# Patient Record
Sex: MALE | Race: WHITE | HISPANIC OR LATINO | URBAN - METROPOLITAN AREA
[De-identification: names, ages, dates, MRNs, and addresses within clinical notes are randomized per-mention and may not be internally consistent; named-entity substitution may affect disease eponyms.]

---

## 2023-04-06 ENCOUNTER — OFFICE VISIT (OUTPATIENT)
Dept: URBAN - METROPOLITAN AREA CLINIC 63 | Facility: CLINIC | Age: 48
End: 2023-04-06

## 2023-04-12 ENCOUNTER — LAB OUTSIDE AN ENCOUNTER (OUTPATIENT)
Dept: URBAN - METROPOLITAN AREA CLINIC 60 | Facility: CLINIC | Age: 48
End: 2023-04-12

## 2023-04-12 ENCOUNTER — OFFICE VISIT (OUTPATIENT)
Dept: URBAN - METROPOLITAN AREA CLINIC 60 | Facility: CLINIC | Age: 48
End: 2023-04-12
Payer: COMMERCIAL

## 2023-04-12 ENCOUNTER — TELEPHONE ENCOUNTER (OUTPATIENT)
Dept: URBAN - METROPOLITAN AREA CLINIC 60 | Facility: CLINIC | Age: 48
End: 2023-04-12

## 2023-04-12 ENCOUNTER — WEB ENCOUNTER (OUTPATIENT)
Dept: URBAN - METROPOLITAN AREA CLINIC 60 | Facility: CLINIC | Age: 48
End: 2023-04-12

## 2023-04-12 VITALS
HEART RATE: 100 BPM | RESPIRATION RATE: 20 BRPM | HEIGHT: 64 IN | OXYGEN SATURATION: 99 % | SYSTOLIC BLOOD PRESSURE: 90 MMHG | DIASTOLIC BLOOD PRESSURE: 60 MMHG | WEIGHT: 89 LBS | TEMPERATURE: 97.9 F | BODY MASS INDEX: 15.19 KG/M2

## 2023-04-12 DIAGNOSIS — K65.2 SBP (SPONTANEOUS BACTERIAL PERITONITIS): ICD-10-CM

## 2023-04-12 DIAGNOSIS — Z12.11 SCREENING FOR MALIGNANT NEOPLASM OF COLON: ICD-10-CM

## 2023-04-12 DIAGNOSIS — B19.10 HEPATITIS B INFECTION WITHOUT DELTA AGENT WITHOUT HEPATIC COMA, UNSPECIFIED CHRONICITY: ICD-10-CM

## 2023-04-12 DIAGNOSIS — K74.69 OTHER CIRRHOSIS OF LIVER: ICD-10-CM

## 2023-04-12 PROBLEM — 111891008: Status: ACTIVE | Noted: 2023-04-12

## 2023-04-12 PROBLEM — 19943007: Status: ACTIVE | Noted: 2023-04-12

## 2023-04-12 PROCEDURE — 99204 OFFICE O/P NEW MOD 45 MIN: CPT | Performed by: PHYSICIAN ASSISTANT

## 2023-04-12 RX ORDER — FERROUS SULFATE TAB 325 MG (65 MG ELEMENTAL FE) 325 (65 FE) MG
TAB ORAL
Qty: 90 EACH | Refills: 0 | Status: ACTIVE | COMMUNITY

## 2023-04-12 RX ORDER — CIPROFLOXACIN 500 MG/1
1 TABLET TABLET, FILM COATED ORAL
Qty: 28 TABLET | Refills: 0 | OUTPATIENT
Start: 2023-04-12 | End: 2023-04-15

## 2023-04-12 RX ORDER — SPIRONOLACTONE 50 MG/1
1 TABLET TABLET, FILM COATED ORAL ONCE A DAY
Status: ACTIVE | COMMUNITY

## 2023-04-12 RX ORDER — SPIRONOLACTONE 50 MG/1
1 TABLET TABLET, FILM COATED ORAL ONCE A DAY
Qty: 30 TABLET | Refills: 1

## 2023-04-12 RX ORDER — FUROSEMIDE 40 MG/1
1 TABLET TABLET ORAL ONCE A DAY
Qty: 30 | Refills: 1

## 2023-04-12 RX ORDER — FUROSEMIDE 40 MG/1
1 TABLET TABLET ORAL ONCE A DAY
Status: ACTIVE | COMMUNITY

## 2023-04-12 RX ORDER — FOLIC ACID 1 MG/1
TAKE 1 TABLET BY MOUTH DAILY TABLET ORAL
Qty: 90 EACH | Refills: 0 | Status: ACTIVE | COMMUNITY

## 2023-04-12 NOTE — HPI-TODAY'S VISIT:
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  He stopped drinking alcohol in January 2023. He was recently admitted to [facility] from March 23 to March 27 with generalized abdominal distention.  His labs on admission demonstrated a hemoglobin 9.3, platelet count 128.  CMP showed an AST 73, ALT 29, alkaline phosphatase 70, total bilirubin 3.7, albumin 2.7, creatinine 0.55, sodium 135.  INR 1.79.  Alpha-fetoprotein normal at 2.68. MELD-Na 21.  Viral hepatitis profile showed a positive hepatitis B surface antigen.  Negative hepatitis B core antibody.  Negative hepatitis C antibody.  ELEANOR and smooth muscle antibody were negative. CT scan of the abdomen and pelvis was done without contrast which showed a large amount of ascites.  There was uniformly distended loops of small bowel with oral contrast extending into the distal small bowel.  There was no contrast within the large bowel raising the possibility of partial small bowel obstruction versus centralization of loops secondary to large ascites. He underwent diagnostic paracentesis on March 23rd.  3.5 L of fluid was removed.  White blood cell count was 257, 49% neutrophils.  There is no fluid culture for review.  Cytology was negative for malignant cells. He was discharged on furosemide 40mg daily and spironolactone 100mg daliy.  He follows up in the office with his wife who translates. He has been doing well since discharge. He has no abdominal distention or abdominal pain. No fevers. He has no pyrosis, dysphagia, odynophagia, nausea,, vomiting, diarrhea, constipation, melena, hematochezia. No prior EGD or colonoscopy. No family history of liver disease or GI malignancy.

## 2023-04-13 ENCOUNTER — TELEPHONE ENCOUNTER (OUTPATIENT)
Dept: URBAN - METROPOLITAN AREA CLINIC 63 | Facility: CLINIC | Age: 48
End: 2023-04-13

## 2023-04-23 LAB
A/G RATIO: 0.7
ALBUMIN: 2.8
ALKALINE PHOSPHATASE: 77
ALPHA-1-ANTITRYPSIN QN: 170
ALT (SGPT): 28
AST (SGOT): 61
BILIRUBIN, TOTAL: 1.9
BUN/CREATININE RATIO: (no result)
BUN: 7
CALCIUM: 8.3
CARBON DIOXIDE, TOTAL: 22
CERULOPLASMIN: 15
CHLORIDE: 95
CREATININE: 0.6
EGFR: 119
FERRITIN, SERUM: 120
GLOBULIN, TOTAL: 4.3
GLUCOSE: 126
HEMATOCRIT: 23.5
HEMOGLOBIN: 8.4
HEP B CORE AB, IGM: (no result)
HEP BE AB: REACTIVE
HEP BE AG: NONREACTIVE
HEPATITIS A AB, TOTAL: REACTIVE
HEPATITIS B SURFACE AB IMMUNITY, QN: <5
HEPATITIS B SURFACE ANTIGEN: REACTIVE
HEPATITIS B VIRUS DNA: 1.41
HEPATITIS B VIRUS DNA: 26
INR: 1.4
IRON BIND.CAP.(TIBC): 239
IRON SATURATION: 28
IRON: 68
MCH: 31.7
MCHC: 35.7
MCV: 88.7
MPV: 9.5
PLATELET COUNT: 137
POTASSIUM: 4.4
PROTEIN, TOTAL: 7.1
PT: 13.3
RDW: 14.7
RED BLOOD CELL COUNT: 2.65
SODIUM: 125
WHITE BLOOD CELL COUNT: 4.6

## 2023-05-11 ENCOUNTER — OFFICE VISIT (OUTPATIENT)
Dept: URBAN - METROPOLITAN AREA CLINIC 63 | Facility: CLINIC | Age: 48
End: 2023-05-11
Payer: COMMERCIAL

## 2023-05-11 ENCOUNTER — TELEPHONE ENCOUNTER (OUTPATIENT)
Dept: URBAN - METROPOLITAN AREA CLINIC 63 | Facility: CLINIC | Age: 48
End: 2023-05-11

## 2023-05-11 ENCOUNTER — WEB ENCOUNTER (OUTPATIENT)
Dept: URBAN - METROPOLITAN AREA CLINIC 63 | Facility: CLINIC | Age: 48
End: 2023-05-11

## 2023-05-11 VITALS
HEART RATE: 95 BPM | WEIGHT: 101.8 LBS | DIASTOLIC BLOOD PRESSURE: 60 MMHG | HEIGHT: 64 IN | OXYGEN SATURATION: 99 % | TEMPERATURE: 97.6 F | BODY MASS INDEX: 17.38 KG/M2 | SYSTOLIC BLOOD PRESSURE: 90 MMHG

## 2023-05-11 DIAGNOSIS — R18.8 OTHER ASCITES: ICD-10-CM

## 2023-05-11 DIAGNOSIS — E87.1 HYPONATREMIA: ICD-10-CM

## 2023-05-11 DIAGNOSIS — B18.1 CHRONIC VIRAL HEPATITIS B WITHOUT DELTA AGENT AND WITHOUT COMA: ICD-10-CM

## 2023-05-11 DIAGNOSIS — K74.60 UNSPECIFIED CIRRHOSIS OF LIVER: ICD-10-CM

## 2023-05-11 PROBLEM — 19943007: Status: ACTIVE | Noted: 2023-05-11

## 2023-05-11 PROBLEM — 61977001: Status: ACTIVE | Noted: 2023-05-11

## 2023-05-11 PROBLEM — 389026000: Status: ACTIVE | Noted: 2023-05-11

## 2023-05-11 PROCEDURE — 99215 OFFICE O/P EST HI 40 MIN: CPT | Performed by: INTERNAL MEDICINE

## 2023-05-11 RX ORDER — FOLIC ACID 1 MG/1
TAKE 1 TABLET BY MOUTH DAILY TABLET ORAL
Qty: 90 EACH | Refills: 0 | Status: ACTIVE | COMMUNITY

## 2023-05-11 RX ORDER — CYPROHEPTADINE HYDROCHLORIDE 4 MG/1
1 TABLET TABLET ORAL TWICE A DAY
Status: ACTIVE | COMMUNITY

## 2023-05-11 RX ORDER — FUROSEMIDE 40 MG/1
1 TABLET TABLET ORAL ONCE A DAY
Qty: 30 | Refills: 1 | Status: ACTIVE | COMMUNITY

## 2023-05-11 RX ORDER — FERROUS SULFATE TAB 325 MG (65 MG ELEMENTAL FE) 325 (65 FE) MG
TAB ORAL
Qty: 90 EACH | Refills: 0 | Status: ACTIVE | COMMUNITY

## 2023-05-11 RX ORDER — SPIRONOLACTONE 50 MG/1
1 TABLET TABLET, FILM COATED ORAL ONCE A DAY
Qty: 30 | Refills: 3 | OUTPATIENT
Start: 2023-05-11 | End: 2023-09-08

## 2023-05-11 RX ORDER — ENTECAVIR 0.5 MG/1
1 TABLET ON AN EMPTY STOMACH TABLET, FILM COATED ORAL ONCE A DAY
Qty: 30 TABLET | Refills: 3 | OUTPATIENT
Start: 2023-05-11 | End: 2023-09-08

## 2023-05-11 RX ORDER — FUROSEMIDE 20 MG/1
1 TABLET TABLET ORAL ONCE A DAY
Qty: 30 | Refills: 3 | OUTPATIENT
Start: 2023-05-11

## 2023-05-11 NOTE — HPI-TODAY'S VISIT:
Jerzy is a 47-year-old Serbian-speaking male that presents to the office today for follow-up.  He is accompanied by his wife.  He is here for follow-up today regarding cirrhosis.  He was hospitalized 03/2023.  At that time he presented to the hospital with increasing abdominal discomfort and abdominal distention.  CT of the abdomen showed a large amount of ascites.  He underwent paracentesis with removal of 3.5 L of fluid.  Work-up for his cirrhosis revealed a positive hepatitis B surface antigen.  He denied prior treatment for hepatitis B.  He does report a history of heavy alcohol abuse.  His last drink of alcohol was 01/2023. He had repeat labs 04/18/2023.  These are summarized below.  He is currently taking Lasix 40 mg daily and spironolactone 50 mg daily.  Denies abdominal distention.  He does complain of generalized fatigue.  At his initial visit, referral was placed to [facility] transplant center.  He has not been contacted by the transplant center. Labs 04/18/2023 hepatitis B DNA 26 IU/ml, HBeAG negative, HBeAB positive, ceruloplasmin 15, iron saturation 28, ferritin 120, creatinine 0.60, sodium 125, potassium 5.4, albumin 2.8, total bilirubin 1.9, alkaline phosphatase 77, AST 61, ALT 28, WBC 4.6, hemoglobin 8.4, platelet 137, INR 1.4.

## 2023-05-18 LAB
A/G RATIO: 0.8
ALBUMIN: 2.5
ALKALINE PHOSPHATASE: 58
ALT (SGPT): 23
AST (SGOT): 48
BILIRUBIN, TOTAL: 1.5
BUN/CREATININE RATIO: 11
BUN: 6
CALCIUM: 7.9
CARBON DIOXIDE, TOTAL: 25
CHLORIDE: 106
CREATININE: 0.53
EGFR: 124
GLOBULIN, TOTAL: 3.1
GLUCOSE: 100
HEMATOCRIT: 21.5
HEMOGLOBIN: 7.7
INR: 1.5
MCH: 32.4
MCHC: 35.8
MCV: 90.3
MPV: 10
PLATELET COUNT: 149
POTASSIUM: 3.8
PROTEIN, TOTAL: 5.6
PT: 14.6
RDW: 13.2
RED BLOOD CELL COUNT: 2.38
SODIUM: 136
WHITE BLOOD CELL COUNT: 2.7

## 2023-05-19 ENCOUNTER — TELEPHONE ENCOUNTER (OUTPATIENT)
Dept: URBAN - METROPOLITAN AREA CLINIC 63 | Facility: CLINIC | Age: 48
End: 2023-05-19

## 2023-05-19 PROBLEM — 19943007: Status: ACTIVE | Noted: 2023-05-19

## 2023-05-25 ENCOUNTER — OFFICE VISIT (OUTPATIENT)
Dept: URBAN - METROPOLITAN AREA CLINIC 63 | Facility: CLINIC | Age: 48
End: 2023-05-25
Payer: COMMERCIAL

## 2023-05-25 ENCOUNTER — WEB ENCOUNTER (OUTPATIENT)
Dept: URBAN - METROPOLITAN AREA CLINIC 63 | Facility: CLINIC | Age: 48
End: 2023-05-25

## 2023-05-25 VITALS
WEIGHT: 97.2 LBS | HEART RATE: 91 BPM | OXYGEN SATURATION: 99 % | SYSTOLIC BLOOD PRESSURE: 90 MMHG | HEIGHT: 64 IN | DIASTOLIC BLOOD PRESSURE: 58 MMHG | TEMPERATURE: 96.4 F | BODY MASS INDEX: 16.59 KG/M2

## 2023-05-25 DIAGNOSIS — K74.60 UNSPECIFIED CIRRHOSIS OF LIVER: ICD-10-CM

## 2023-05-25 DIAGNOSIS — B18.1 CHRONIC VIRAL HEPATITIS B WITHOUT DELTA AGENT AND WITHOUT COMA: ICD-10-CM

## 2023-05-25 DIAGNOSIS — Z12.11 SCREENING FOR MALIGNANT NEOPLASM OF COLON: ICD-10-CM

## 2023-05-25 DIAGNOSIS — D64.89 ANEMIA DUE TO OTHER CAUSE, NOT CLASSIFIED: ICD-10-CM

## 2023-05-25 DIAGNOSIS — K74.69 OTHER CIRRHOSIS OF LIVER: ICD-10-CM

## 2023-05-25 DIAGNOSIS — R18.8 OTHER ASCITES: ICD-10-CM

## 2023-05-25 PROCEDURE — 99214 OFFICE O/P EST MOD 30 MIN: CPT | Performed by: PHYSICIAN ASSISTANT

## 2023-05-25 RX ORDER — FERROUS SULFATE TAB 325 MG (65 MG ELEMENTAL FE) 325 (65 FE) MG
TAB ORAL
Qty: 90 EACH | Refills: 0 | Status: ACTIVE | COMMUNITY

## 2023-05-25 RX ORDER — FUROSEMIDE 40 MG/1
1 TABLET TABLET ORAL ONCE A DAY
OUTPATIENT

## 2023-05-25 RX ORDER — FOLIC ACID 1 MG/1
TAKE 1 TABLET BY MOUTH DAILY TABLET ORAL
Qty: 90 EACH | Refills: 0 | Status: ACTIVE | COMMUNITY

## 2023-05-25 RX ORDER — FUROSEMIDE 20 MG/1
1 TABLET TABLET ORAL ONCE A DAY
Qty: 30 | Refills: 3 | Status: ACTIVE | COMMUNITY
Start: 2023-05-11

## 2023-05-25 RX ORDER — SPIRONOLACTONE 50 MG/1
1 TABLET TABLET, FILM COATED ORAL ONCE A DAY
Qty: 30 | Refills: 3 | Status: ACTIVE | COMMUNITY
Start: 2023-05-11 | End: 2023-09-08

## 2023-05-25 RX ORDER — FUROSEMIDE 40 MG/1
1 TABLET TABLET ORAL ONCE A DAY
Qty: 30 | Refills: 1 | Status: ACTIVE | COMMUNITY

## 2023-05-25 RX ORDER — CYPROHEPTADINE HYDROCHLORIDE 4 MG/1
1 TABLET TABLET ORAL TWICE A DAY
Status: ACTIVE | COMMUNITY

## 2023-05-25 RX ORDER — ENTECAVIR 0.5 MG/1
1 TABLET ON AN EMPTY STOMACH TABLET, FILM COATED ORAL ONCE A DAY
Qty: 30 TABLET | Refills: 3 | Status: ACTIVE | COMMUNITY
Start: 2023-05-11 | End: 2023-09-08

## 2023-05-25 NOTE — PHYSICAL EXAM NEUROLOGIC:
oriented to person, place and time , normal sensation , short and long term memory intact . No asterixis

## 2023-05-25 NOTE — HPI-TODAY'S VISIT:
47-year-old male with cirrhosis related to alcohol abuse and chronic hepatitis B presents for follow-up.  He was hospitalized in March 2023.  He presented to the hospital with increasing abdominal discomfort and abdominal distention.  CT scan of the abdomen showed a large amount of ascites.  He underwent paracentesis with removal of 3.5 L of fluid.  Work-up of his cirrhosis revealed a positive hepatitis B surface antigen.  He had not received any prior therapy for hepatitis B.  He reported a history of heavy alcohol abuse.  He has not had any alcohol since January 2023.  His labs done in March demonstrated hepatitis B DNA 26 IU/mL, HBE antigen negative, HBV antibody positive.  His full hepatic panel was otherwise negative.  He was referred to transplant hepatology at [facility] and also to [facility] transplant hepatology. He was last seen in the office on May 11, 2023.  He was started on Entecavir 0.5 mg daily. He follows up today after having repeat labs done on May 18, 2023.  His CBC demonstrated a WBC 2.7, hemoglobin 7.7, platelet count 149, MCV 90.3.  His CMP revealed AST 48, ALT 23, alkaline phosphatase 58, total bilirubin 1.5, albumin 2.5, sodium 136, potassium 3.8, creatinine 0.53.  INR 1.5. MELD-Na 15.  Repeat iron studies and CBC were ordered but results have not yet been received. Triple phase CT was done March 16, 2023 which demonstrated chronic parenchymal liver disease with CT evidence of portal hypertension.  No discrete liver mass or abnormal enhancement to suggest malignancy.  Alpha-fetoprotein was normal at 2.68 in March 2023.  He has been contactated by transplant hepatology but does not have an appointment scheduled yet. He does not know which transplant center contacted him. He has no GI complaints. Has not had any further abdominal distention. No peripheral edema. He is eating well. He is not drinking alcohol. He is tolerating entecavir well. He has no pyrosis, dysphaiga, odynophagia, N/V/D/C. No hematemesis, melena, hematochezia.

## 2023-06-06 LAB
A/G RATIO: 0.8
ALBUMIN: 3.2
ALKALINE PHOSPHATASE: 71
ALT (SGPT): 23
AST (SGOT): 51
BILIRUBIN, TOTAL: 1.8
BUN/CREATININE RATIO: 19
BUN: 11
CALCIUM: 8.6
CARBON DIOXIDE, TOTAL: 22
CHLORIDE: 93
CREATININE: 0.58
EGFR: 120
FERRITIN, SERUM: 55
FOLATE (FOLIC ACID), SERUM: 20.2
GLOBULIN, TOTAL: 3.9
GLUCOSE: 95
HEMATOCRIT: 21.8
HEMOGLOBIN: 7.9
INR: 1.3
IRON BIND.CAP.(TIBC): 311
IRON SATURATION: 25
IRON: 78
MCH: 30.6
MCHC: 36.2
MCV: 84.5
MPV: 9.5
PLATELET COUNT: 141
POTASSIUM: 4.8
PROTEIN, TOTAL: 7.1
PT: 13
RDW: 14.5
RED BLOOD CELL COUNT: 2.58
SODIUM: 122
VITAMIN B12: 800
WHITE BLOOD CELL COUNT: 5

## 2023-06-12 ENCOUNTER — TELEPHONE ENCOUNTER (OUTPATIENT)
Dept: URBAN - METROPOLITAN AREA CLINIC 63 | Facility: CLINIC | Age: 48
End: 2023-06-12

## 2023-08-16 ENCOUNTER — TELEPHONE ENCOUNTER (OUTPATIENT)
Dept: URBAN - METROPOLITAN AREA CLINIC 63 | Facility: CLINIC | Age: 48
End: 2023-08-16

## 2023-08-17 ENCOUNTER — TELEPHONE ENCOUNTER (OUTPATIENT)
Dept: URBAN - METROPOLITAN AREA CLINIC 63 | Facility: CLINIC | Age: 48
End: 2023-08-17

## 2023-08-23 ENCOUNTER — WEB ENCOUNTER (OUTPATIENT)
Dept: URBAN - METROPOLITAN AREA CLINIC 63 | Facility: CLINIC | Age: 48
End: 2023-08-23

## 2023-08-23 ENCOUNTER — LAB OUTSIDE AN ENCOUNTER (OUTPATIENT)
Dept: URBAN - METROPOLITAN AREA CLINIC 63 | Facility: CLINIC | Age: 48
End: 2023-08-23

## 2023-08-23 ENCOUNTER — OFFICE VISIT (OUTPATIENT)
Dept: URBAN - METROPOLITAN AREA CLINIC 63 | Facility: CLINIC | Age: 48
End: 2023-08-23
Payer: COMMERCIAL

## 2023-08-23 ENCOUNTER — DASHBOARD ENCOUNTERS (OUTPATIENT)
Age: 48
End: 2023-08-23

## 2023-08-23 ENCOUNTER — OFFICE VISIT (OUTPATIENT)
Dept: URBAN - METROPOLITAN AREA CLINIC 63 | Facility: CLINIC | Age: 48
End: 2023-08-23

## 2023-08-23 VITALS
OXYGEN SATURATION: 98 % | BODY MASS INDEX: 18.92 KG/M2 | HEART RATE: 91 BPM | DIASTOLIC BLOOD PRESSURE: 60 MMHG | WEIGHT: 110.8 LBS | TEMPERATURE: 98.2 F | HEIGHT: 64 IN | SYSTOLIC BLOOD PRESSURE: 100 MMHG

## 2023-08-23 DIAGNOSIS — Z12.11 SCREENING FOR COLON CANCER: ICD-10-CM

## 2023-08-23 DIAGNOSIS — B19.10 HEPATITIS B INFECTION WITHOUT DELTA AGENT WITHOUT HEPATIC COMA, UNSPECIFIED CHRONICITY: ICD-10-CM

## 2023-08-23 DIAGNOSIS — D64.9 ANEMIA, UNSPECIFIED TYPE: ICD-10-CM

## 2023-08-23 DIAGNOSIS — K74.60 HEPATIC CIRRHOSIS, UNSPECIFIED HEPATIC CIRRHOSIS TYPE, UNSPECIFIED WHETHER ASCITES PRESENT: ICD-10-CM

## 2023-08-23 PROCEDURE — 99214 OFFICE O/P EST MOD 30 MIN: CPT | Performed by: INTERNAL MEDICINE

## 2023-08-23 RX ORDER — FUROSEMIDE 20 MG/1
1 TABLET TABLET ORAL ONCE A DAY
Qty: 30 | Refills: 3 | Status: ACTIVE | COMMUNITY
Start: 2023-05-11

## 2023-08-23 RX ORDER — FOLIC ACID 1 MG/1
TAKE 1 TABLET BY MOUTH DAILY TABLET ORAL
Qty: 90 EACH | Refills: 0 | Status: ACTIVE | COMMUNITY

## 2023-08-23 RX ORDER — ENTECAVIR 0.5 MG/1
1 TABLET ON AN EMPTY STOMACH TABLET, FILM COATED ORAL ONCE A DAY
Qty: 30 TABLET | Refills: 3 | Status: ACTIVE | COMMUNITY
Start: 2023-05-11 | End: 2023-09-08

## 2023-08-23 RX ORDER — FERROUS SULFATE TAB 325 MG (65 MG ELEMENTAL FE) 325 (65 FE) MG
TAB ORAL
Qty: 90 EACH | Refills: 0 | Status: ACTIVE | COMMUNITY

## 2023-08-23 RX ORDER — CYPROHEPTADINE HYDROCHLORIDE 4 MG/1
1 TABLET TABLET ORAL TWICE A DAY
Status: ACTIVE | COMMUNITY

## 2023-08-23 RX ORDER — SPIRONOLACTONE 50 MG/1
1 TABLET TABLET, FILM COATED ORAL ONCE A DAY
Qty: 30 | Refills: 3 | Status: ACTIVE | COMMUNITY
Start: 2023-05-11 | End: 2023-09-08

## 2023-08-23 NOTE — HPI-TODAY'S VISIT:
Jerzy is a 48-year-old male that presents today for follow-up.  Last seen in our office 05/2023.  History of cirrhosis secondary to alcohol abuse and chronic hepatitis B.  Cirrhosis has been complicated by ascites requiring paracentesis.  He has been referred to transplant hepatology and is currently undergoing transplant evaluation at the [facility].  He is on Entecavir for chronic hepatitis B.  Most recent labs available are from 06/05/2023.  Findings are summarized below.  He underwent triple phase pelvis 03/16/2023. He has had more recent imaging through the [facility] available at this time. In follow-up today, he seems to be doing better.  Denies abdominal pain or increased abdominal distention.  Denies blood in the stool, melena, hematochezia, hematemesis.  As above, he is undergoing emergency in [place].  EGD and colonoscopy have been requested as part of his transplant work-up. Labs 06/05/2023:BUN 11, creatinine 0.58, sodium 122, potassium 4.8, albumin 3.2, bilirubin 1.8, alkaline phosphatase 71, AST 51, ALT 23, INR 1.3 PT 13.0, WBC 5.0, hemoglobin 7.9, hematocrit 21.8, platelet 141, iron saturation 25, ferritin 55.

## 2023-08-25 ENCOUNTER — OFFICE VISIT (OUTPATIENT)
Dept: URBAN - METROPOLITAN AREA SURGERY CENTER 4 | Facility: SURGERY CENTER | Age: 48
End: 2023-08-25

## 2023-08-25 ENCOUNTER — TELEPHONE ENCOUNTER (OUTPATIENT)
Dept: URBAN - METROPOLITAN AREA CLINIC 64 | Facility: CLINIC | Age: 48
End: 2023-08-25

## 2023-08-25 RX ORDER — SPIRONOLACTONE 50 MG/1
1 TABLET TABLET, FILM COATED ORAL ONCE A DAY
Qty: 30 | Refills: 3 | Status: ACTIVE | COMMUNITY
Start: 2023-05-11 | End: 2023-09-08

## 2023-08-25 RX ORDER — FERROUS SULFATE TAB 325 MG (65 MG ELEMENTAL FE) 325 (65 FE) MG
TAB ORAL
Qty: 90 EACH | Refills: 0 | Status: ACTIVE | COMMUNITY

## 2023-08-25 RX ORDER — CYPROHEPTADINE HYDROCHLORIDE 4 MG/1
1 TABLET TABLET ORAL TWICE A DAY
Status: ACTIVE | COMMUNITY

## 2023-08-25 RX ORDER — ENTECAVIR 0.5 MG/1
1 TABLET ON AN EMPTY STOMACH TABLET, FILM COATED ORAL ONCE A DAY
Qty: 30 TABLET | Refills: 3 | Status: ACTIVE | COMMUNITY
Start: 2023-05-11 | End: 2023-09-08

## 2023-08-25 RX ORDER — FOLIC ACID 1 MG/1
TAKE 1 TABLET BY MOUTH DAILY TABLET ORAL
Qty: 90 EACH | Refills: 0 | Status: ACTIVE | COMMUNITY

## 2023-08-25 RX ORDER — FUROSEMIDE 20 MG/1
1 TABLET TABLET ORAL ONCE A DAY
Qty: 30 | Refills: 3 | Status: ACTIVE | COMMUNITY
Start: 2023-05-11

## 2023-08-29 ENCOUNTER — TELEPHONE ENCOUNTER (OUTPATIENT)
Dept: URBAN - METROPOLITAN AREA CLINIC 63 | Facility: CLINIC | Age: 48
End: 2023-08-29

## 2023-09-06 ENCOUNTER — TELEPHONE ENCOUNTER (OUTPATIENT)
Dept: URBAN - METROPOLITAN AREA CLINIC 63 | Facility: CLINIC | Age: 48
End: 2023-09-06

## 2023-09-11 ENCOUNTER — OFFICE VISIT (OUTPATIENT)
Dept: URBAN - METROPOLITAN AREA MEDICAL CENTER 3 | Facility: MEDICAL CENTER | Age: 48
End: 2023-09-11

## 2023-10-05 ENCOUNTER — OFFICE VISIT (OUTPATIENT)
Dept: URBAN - METROPOLITAN AREA CLINIC 63 | Facility: CLINIC | Age: 48
End: 2023-10-05

## 2025-02-18 ENCOUNTER — OFFICE VISIT (OUTPATIENT)
Dept: URBAN - METROPOLITAN AREA CLINIC 60 | Facility: CLINIC | Age: 50
End: 2025-02-18
Payer: COMMERCIAL

## 2025-02-18 ENCOUNTER — LAB OUTSIDE AN ENCOUNTER (OUTPATIENT)
Dept: URBAN - METROPOLITAN AREA CLINIC 60 | Facility: CLINIC | Age: 50
End: 2025-02-18

## 2025-02-18 VITALS
SYSTOLIC BLOOD PRESSURE: 130 MMHG | TEMPERATURE: 97.4 F | BODY MASS INDEX: 22.2 KG/M2 | RESPIRATION RATE: 20 BRPM | WEIGHT: 130 LBS | DIASTOLIC BLOOD PRESSURE: 80 MMHG | HEART RATE: 69 BPM | OXYGEN SATURATION: 99 % | HEIGHT: 64 IN

## 2025-02-18 DIAGNOSIS — K74.60 HEPATIC CIRRHOSIS, UNSPECIFIED HEPATIC CIRRHOSIS TYPE, UNSPECIFIED WHETHER ASCITES PRESENT: ICD-10-CM

## 2025-02-18 DIAGNOSIS — B18.1 CHRONIC VIRAL HEPATITIS B WITHOUT DELTA AGENT AND WITHOUT COMA: ICD-10-CM

## 2025-02-18 DIAGNOSIS — B19.10 HEPATITIS B INFECTION WITHOUT DELTA AGENT WITHOUT HEPATIC COMA, UNSPECIFIED CHRONICITY: ICD-10-CM

## 2025-02-18 PROCEDURE — 99204 OFFICE O/P NEW MOD 45 MIN: CPT | Performed by: NURSE PRACTITIONER

## 2025-02-18 RX ORDER — FOLIC ACID 1 MG/1
TAKE 1 TABLET BY MOUTH DAILY TABLET ORAL
Qty: 90 EACH | Refills: 0 | Status: ACTIVE | COMMUNITY

## 2025-02-18 RX ORDER — CARVEDILOL 6.25 MG/1
1 TABLET WITH FOOD TABLET, FILM COATED ORAL TWICE A DAY
Status: ACTIVE | COMMUNITY

## 2025-02-18 RX ORDER — FERROUS SULFATE TAB 325 MG (65 MG ELEMENTAL FE) 325 (65 FE) MG
TAB ORAL
Qty: 90 EACH | Refills: 0 | Status: ACTIVE | COMMUNITY

## 2025-02-18 RX ORDER — FUROSEMIDE 20 MG/1
1 TABLET TABLET ORAL ONCE A DAY
Qty: 30 | Refills: 3 | Status: ACTIVE | COMMUNITY
Start: 2023-05-11

## 2025-02-18 NOTE — HPI-TODAY'S VISIT:
Jerzy is a 48-year-old male that presents today for follow-up.  Last seen in our office 05/2023.  History of cirrhosis secondary to alcohol abuse and chronic hepatitis B.  Cirrhosis has been complicated by ascites requiring paracentesis.  He has been referred to transplant hepatology and is currently undergoing transplant evaluation at the [facility].  He is on Entecavir for chronic hepatitis B.  Most recent labs available are from 06/05/2023.  Findings are summarized below.  He underwent triple phase pelvis 03/16/2023. He has had more recent imaging through the [facility] available at this time. In follow-up today, he seems to be doing better.  Denies abdominal pain or increased abdominal distention.  Denies blood in the stool, melena, hematochezia, hematemesis.  As above, he is undergoing emergency in [place].  EGD and colonoscopy have been requested as part of his transplant work-up. Labs 06/05/2023:BUN 11, creatinine 0.58, sodium 122, potassium 4.8, albumin 3.2, bilirubin 1.8, alkaline phosphatase 71, AST 51, ALT 23, INR 1.3 PT 13.0, WBC 5.0, hemoglobin 7.9, hematocrit 21.8, platelet 141, iron saturation 25, ferritin 55.  02/25 Patient here today in good general state.  Today he has no GI complaints, he is here for his routine office visit.  Patient has medical history of liver cirrhosis secondary to alcohol abuse and chronic hepatitis B.  Patient states he was following a transplant team at [facility] and also was doing treatment with intake of beer for a while.  He said he is not being followed by 13 anymore at [facility] he also not doing treatment with Entecavir anymore.  He has some recent laboratories done by her primary doctor that demonstrate normal liver enzymes, normal bilirubin and alkaline phosphatase, protein is and albumin.  His platelets remain below 138, also anemia hemoglobin 12.4 and hematocrit 38.1. Today he is in good general state he has normal coherent conversation, there is no sign of edema, ascites, GI bleeding, or infection. I do not have any record of EGD or colonoscopy on him, I am going to order this procedure in his next office visit.   we are going to do laboratories, MRI liver as a hepatocarcinoma protocol, and liver Doppler.

## 2025-03-03 ENCOUNTER — LAB OUTSIDE AN ENCOUNTER (OUTPATIENT)
Dept: URBAN - METROPOLITAN AREA CLINIC 63 | Facility: CLINIC | Age: 50
End: 2025-03-03

## 2025-04-18 ENCOUNTER — OFFICE VISIT (OUTPATIENT)
Dept: URBAN - METROPOLITAN AREA CLINIC 60 | Facility: CLINIC | Age: 50
End: 2025-04-18

## 2025-04-18 RX ORDER — CARVEDILOL 6.25 MG/1
1 TABLET WITH FOOD TABLET, FILM COATED ORAL TWICE A DAY
Status: ACTIVE | COMMUNITY

## 2025-04-18 RX ORDER — FOLIC ACID 1 MG/1
TAKE 1 TABLET BY MOUTH DAILY TABLET ORAL
Qty: 90 EACH | Refills: 0 | Status: ACTIVE | COMMUNITY

## 2025-04-18 RX ORDER — FUROSEMIDE 20 MG/1
1 TABLET TABLET ORAL ONCE A DAY
Qty: 30 | Refills: 3 | Status: ACTIVE | COMMUNITY
Start: 2023-05-11

## 2025-04-18 RX ORDER — FERROUS SULFATE TAB 325 MG (65 MG ELEMENTAL FE) 325 (65 FE) MG
TAB ORAL
Qty: 90 EACH | Refills: 0 | Status: ACTIVE | COMMUNITY